# Patient Record
Sex: FEMALE | Race: WHITE | NOT HISPANIC OR LATINO | Employment: OTHER | URBAN - METROPOLITAN AREA
[De-identification: names, ages, dates, MRNs, and addresses within clinical notes are randomized per-mention and may not be internally consistent; named-entity substitution may affect disease eponyms.]

---

## 2019-01-08 ENCOUNTER — HOSPITAL ENCOUNTER (OUTPATIENT)
Dept: RADIOLOGY | Facility: MEDICAL CENTER | Age: 59
End: 2019-01-08
Attending: ORTHOPAEDIC SURGERY

## 2019-01-08 DIAGNOSIS — M79.642 PAIN OF LEFT HAND: ICD-10-CM

## 2021-10-19 ENCOUNTER — HOSPITAL ENCOUNTER (OUTPATIENT)
Dept: HOSPITAL 95 - LAB SHORT | Age: 61
Discharge: HOME | End: 2021-10-19
Attending: NURSE PRACTITIONER
Payer: COMMERCIAL

## 2021-10-19 DIAGNOSIS — Z01.419: Primary | ICD-10-CM

## 2021-10-19 PROCEDURE — G0145 SCR C/V CYTO,THINLAYER,RESCR: HCPCS

## 2021-10-21 LAB — OTHER STN SPEC: (no result)

## 2024-08-29 ENCOUNTER — HOSPITAL ENCOUNTER (OUTPATIENT)
Dept: HOSPITAL 95 - ORSCMMR | Age: 64
Discharge: HOME | End: 2024-08-29
Attending: INTERNAL MEDICINE
Payer: COMMERCIAL

## 2024-08-29 VITALS — DIASTOLIC BLOOD PRESSURE: 67 MMHG | SYSTOLIC BLOOD PRESSURE: 111 MMHG

## 2024-08-29 VITALS — DIASTOLIC BLOOD PRESSURE: 68 MMHG | SYSTOLIC BLOOD PRESSURE: 113 MMHG

## 2024-08-29 VITALS — SYSTOLIC BLOOD PRESSURE: 132 MMHG | DIASTOLIC BLOOD PRESSURE: 94 MMHG

## 2024-08-29 VITALS — DIASTOLIC BLOOD PRESSURE: 67 MMHG | SYSTOLIC BLOOD PRESSURE: 109 MMHG

## 2024-08-29 VITALS — DIASTOLIC BLOOD PRESSURE: 74 MMHG | SYSTOLIC BLOOD PRESSURE: 114 MMHG

## 2024-08-29 VITALS — DIASTOLIC BLOOD PRESSURE: 74 MMHG | SYSTOLIC BLOOD PRESSURE: 129 MMHG

## 2024-08-29 VITALS — SYSTOLIC BLOOD PRESSURE: 122 MMHG | DIASTOLIC BLOOD PRESSURE: 72 MMHG

## 2024-08-29 VITALS — DIASTOLIC BLOOD PRESSURE: 74 MMHG | SYSTOLIC BLOOD PRESSURE: 118 MMHG

## 2024-08-29 VITALS — SYSTOLIC BLOOD PRESSURE: 122 MMHG | DIASTOLIC BLOOD PRESSURE: 70 MMHG

## 2024-08-29 VITALS — DIASTOLIC BLOOD PRESSURE: 70 MMHG | SYSTOLIC BLOOD PRESSURE: 119 MMHG

## 2024-08-29 VITALS — DIASTOLIC BLOOD PRESSURE: 79 MMHG | SYSTOLIC BLOOD PRESSURE: 138 MMHG

## 2024-08-29 VITALS — SYSTOLIC BLOOD PRESSURE: 123 MMHG | DIASTOLIC BLOOD PRESSURE: 88 MMHG

## 2024-08-29 VITALS — DIASTOLIC BLOOD PRESSURE: 73 MMHG | SYSTOLIC BLOOD PRESSURE: 121 MMHG

## 2024-08-29 VITALS — HEIGHT: 63.39 IN | WEIGHT: 115.3 LBS | BODY MASS INDEX: 20.18 KG/M2

## 2024-08-29 VITALS — SYSTOLIC BLOOD PRESSURE: 101 MMHG | DIASTOLIC BLOOD PRESSURE: 68 MMHG

## 2024-08-29 VITALS — DIASTOLIC BLOOD PRESSURE: 78 MMHG | SYSTOLIC BLOOD PRESSURE: 124 MMHG

## 2024-08-29 DIAGNOSIS — B00.9: ICD-10-CM

## 2024-08-29 DIAGNOSIS — M81.0: ICD-10-CM

## 2024-08-29 DIAGNOSIS — Z12.11: Primary | ICD-10-CM

## 2024-08-29 DIAGNOSIS — D12.4: ICD-10-CM

## 2024-08-29 DIAGNOSIS — Z79.899: ICD-10-CM

## 2024-08-29 DIAGNOSIS — Z83.719: ICD-10-CM

## 2024-08-29 PROCEDURE — 0DBM8ZX EXCISION OF DESCENDING COLON, VIA NATURAL OR ARTIFICIAL OPENING ENDOSCOPIC, DIAGNOSTIC: ICD-10-PCS | Performed by: INTERNAL MEDICINE

## 2024-08-29 NOTE — NUR
Patient up to Ambulate independently. Gait steady.
Discharge instructions reviewed with patient. Patient verbalizes understanding.
Copy given to patient to take home.
Patient States Post-Procedure ride home has been arranged.
Discharged via wheelchair to private car for ride home.
DENIES PAIN, TOLERATING PO. REPORTS READY TO GO HOME.

## 2024-08-29 NOTE — NUR
History, Chart, Medications and Allergies reviewed before start of
procedure.Patient confirms NPO status and agrees with scheduled surgery.
Patient states colon prep results clear.Lungs clear T/O to Auscultation.
Patient States Post-Procedure ride home has been arranged.

## 2024-08-29 NOTE — NUR
08/29/24 1044 Norman Rodriguez
HISTORY, CHART, MEDICATIONS AND ALLERGIES REVIEWED BEFORE START OF
PROCEDURE. PATIENT CONFIRMS NPO STATUS AND AGREES WITH SCHEDULED
PROCEDURE. 3-LEAD EKG REVIEWED WITH PHYSICIAN PRIOR TO START OF
PROCEDURE. MONITOR INTACT WITH CONTINUOUS PULSE OXIMETRY,CAPNOGRAPHY,
3-LEAD EKG, INTERMITTENT BP. SUPPLEMENTAL O2 TO BE TITRATED THROUGHOUT
PROCEDURE TO MAINTAIN O2 SATURATION ABOVE 90%. PATIENT DETERMINED TO
BE ASA APPROPRIATE FOR PROPOFOL SEDATION PRIOR TO START OF PROCEDURE
BY DR. SHIPLEY.